# Patient Record
Sex: FEMALE | Race: WHITE
[De-identification: names, ages, dates, MRNs, and addresses within clinical notes are randomized per-mention and may not be internally consistent; named-entity substitution may affect disease eponyms.]

---

## 2021-03-10 ENCOUNTER — HOSPITAL ENCOUNTER (INPATIENT)
Dept: HOSPITAL 96 - M.ERS | Age: 66
LOS: 2 days | Discharge: HOME | DRG: 377 | End: 2021-03-12
Attending: INTERNAL MEDICINE | Admitting: INTERNAL MEDICINE
Payer: COMMERCIAL

## 2021-03-10 VITALS — DIASTOLIC BLOOD PRESSURE: 83 MMHG | SYSTOLIC BLOOD PRESSURE: 165 MMHG

## 2021-03-10 VITALS — DIASTOLIC BLOOD PRESSURE: 70 MMHG | SYSTOLIC BLOOD PRESSURE: 109 MMHG

## 2021-03-10 VITALS — BODY MASS INDEX: 40.29 KG/M2 | HEIGHT: 64.02 IN | WEIGHT: 236 LBS

## 2021-03-10 VITALS — DIASTOLIC BLOOD PRESSURE: 47 MMHG | SYSTOLIC BLOOD PRESSURE: 125 MMHG

## 2021-03-10 VITALS — DIASTOLIC BLOOD PRESSURE: 64 MMHG | SYSTOLIC BLOOD PRESSURE: 130 MMHG

## 2021-03-10 VITALS — DIASTOLIC BLOOD PRESSURE: 76 MMHG | SYSTOLIC BLOOD PRESSURE: 161 MMHG

## 2021-03-10 VITALS — SYSTOLIC BLOOD PRESSURE: 128 MMHG | DIASTOLIC BLOOD PRESSURE: 57 MMHG

## 2021-03-10 DIAGNOSIS — E11.9: ICD-10-CM

## 2021-03-10 DIAGNOSIS — J44.9: ICD-10-CM

## 2021-03-10 DIAGNOSIS — K44.9: ICD-10-CM

## 2021-03-10 DIAGNOSIS — I65.29: ICD-10-CM

## 2021-03-10 DIAGNOSIS — K64.8: ICD-10-CM

## 2021-03-10 DIAGNOSIS — F32.9: ICD-10-CM

## 2021-03-10 DIAGNOSIS — J96.10: ICD-10-CM

## 2021-03-10 DIAGNOSIS — Z90.710: ICD-10-CM

## 2021-03-10 DIAGNOSIS — E03.9: ICD-10-CM

## 2021-03-10 DIAGNOSIS — Z90.49: ICD-10-CM

## 2021-03-10 DIAGNOSIS — Z20.822: ICD-10-CM

## 2021-03-10 DIAGNOSIS — K92.2: Primary | ICD-10-CM

## 2021-03-10 DIAGNOSIS — Z86.718: ICD-10-CM

## 2021-03-10 DIAGNOSIS — K21.9: ICD-10-CM

## 2021-03-10 DIAGNOSIS — I10: ICD-10-CM

## 2021-03-10 DIAGNOSIS — F17.210: ICD-10-CM

## 2021-03-10 DIAGNOSIS — E78.00: ICD-10-CM

## 2021-03-10 DIAGNOSIS — K55.039: ICD-10-CM

## 2021-03-10 DIAGNOSIS — R65.10: ICD-10-CM

## 2021-03-10 LAB
ABSOLUTE MONOCYTES: 0.5 THOU/UL (ref 0–1.2)
ALBUMIN SERPL-MCNC: 3.1 G/DL (ref 3.4–5)
ALP SERPL-CCNC: 134 U/L (ref 46–116)
ALT SERPL-CCNC: 83 U/L (ref 30–65)
ANION GAP SERPL CALC-SCNC: 9 MMOL/L (ref 7–16)
AST SERPL-CCNC: 33 U/L (ref 15–37)
BACTERIA-REFLEX: (no result) /HPF
BILIRUB SERPL-MCNC: 0.4 MG/DL
BILIRUB UR-MCNC: NEGATIVE MG/DL
BUN SERPL-MCNC: 26 MG/DL (ref 7–18)
CALCIUM SERPL-MCNC: 9.5 MG/DL (ref 8.5–10.1)
CHLORIDE SERPL-SCNC: 101 MMOL/L (ref 98–107)
CO2 SERPL-SCNC: 28 MMOL/L (ref 21–32)
COLOR UR: YELLOW
CREAT SERPL-MCNC: 0.8 MG/DL (ref 0.6–1.3)
GLUCOSE SERPL-MCNC: 183 MG/DL (ref 70–99)
GRANULOCYTES NFR BLD MANUAL: 84 %
HCT VFR BLD CALC: 43 % (ref 37–47)
HGB BLD-MCNC: 13.6 GM/DL (ref 12–15)
INR PPP: 0.9
KETONES UR STRIP-MCNC: NEGATIVE MG/DL
LIPASE: 75 U/L (ref 73–393)
LYMPHOCYTES # BLD: 1.9 THOU/UL (ref 0.8–5.3)
LYMPHOCYTES NFR BLD AUTO: 8 %
MCH RBC QN AUTO: 27.5 PG (ref 26–34)
MCHC RBC AUTO-ENTMCNC: 31.6 G/DL (ref 28–37)
MCV RBC: 87 FL (ref 80–100)
METAMYELOCYTES NFR BLD: 1 %
MONOCYTES NFR BLD: 3 %
MPV: 7.1 FL. (ref 7.2–11.1)
NEUTROPHILS # BLD: 13.3 THOU/UL (ref 1.6–8.1)
NUCLEATED RBCS: 0 /100WBC
PLATELET # BLD EST: ADEQUATE 10*3/UL
PLATELET COUNT*: 400 THOU/UL (ref 150–400)
POTASSIUM SERPL-SCNC: 4 MMOL/L (ref 3.5–5.1)
PROT SERPL-MCNC: 7.4 G/DL (ref 6.4–8.2)
PROT UR QL STRIP: NEGATIVE
PROTHROMBIN TIME: 9.8 SECONDS (ref 9.2–11.5)
RBC # BLD AUTO: 4.94 MIL/UL (ref 4.2–5)
RBC # UR STRIP: (no result) /UL
RBC MORPH BLD: NORMAL
RDW-CV: 15.5 % (ref 10.5–14.5)
RENAL EPITHELIAL CELLS: (no result) /LPF
SODIUM SERPL-SCNC: 138 MMOL/L (ref 136–145)
SP GR UR STRIP: >= 1.03 (ref 1–1.03)
SQUAMOUS: (no result) /LPF (ref 0–3)
URINE CLARITY: CLEAR
URINE GLUCOSE-RANDOM: NEGATIVE
URINE LEUKOCYTES-REFLEX: NEGATIVE
URINE NITRITE-REFLEX: NEGATIVE
URINE WBC-REFLEX: (no result) /HPF (ref 0–5)
UROBILINOGEN UR STRIP-ACNC: 0.2 E.U./DL (ref 0.2–1)
VARIANT LYMPHS NFR BLD MANUAL: 4 %
WBC # BLD AUTO: 15.7 THOU/UL (ref 4–11)

## 2021-03-10 NOTE — EKG
[place] [address]
Phone:  [phone]                     ELECTROCARDIOGRAM REPORT      
_______________________________________________________________________________
 
Name:         ALFREDO ZARAGOZA            Room:          [address]    ADM [place] 
M.R.#:    [id number]     Account #:     [id number]  
Admission:    03/10/21    Attend Phys:   Liang Perez, 
Discharge:                Date of Birth: [birth year]  
Date of Service: 03/10/21 0638  Report #:      [id number]
        [id number]
_______________________________________________________________________________
THIS REPORT FOR:  //name//                      
 
                         [facility] ED
                                       
Test Date:    2021-03-10               Test Time:    06:38:19
Pat Name:     ALFREDOCORTNEY ZARAGOZA         Department:   
Patient ID:   [id number]            Room:         [facility]
Gender:       F                        Technician:   [facility]
:          1955               Requested By: Selena Owusu
Order Number: [id number] MD:   Lawrence Andres
                                 Measurements
Intervals                              Axis          
Rate:         75                       P:            -37
LA:           181                      QRS:          46
QRSD:         94                       T:            53
QT:           397                                    
QTc:          444                                    
                           Interpretive Statements
Sinus rhythm
No previous ECG available for comparison
Electronically Signed On 3- 13:00:26 CST by Lawrence Andres
[email or web address]
 
 
 
 
 
 
 
 
 
 
 
 
 
 
 
 
 
 
 
 
 
 
  <ELECTRONICALLY SIGNED>
                                           By: Lawrence Andres MD, FAC     
  03/10/21     1300
D: 03/10/21 0638   _____________________________________
T: 03/10/21 0638   Lawrence Andres MD, [facility]       /EPI

## 2021-03-11 VITALS — SYSTOLIC BLOOD PRESSURE: 146 MMHG | DIASTOLIC BLOOD PRESSURE: 37 MMHG

## 2021-03-11 VITALS — SYSTOLIC BLOOD PRESSURE: 141 MMHG | DIASTOLIC BLOOD PRESSURE: 65 MMHG

## 2021-03-11 VITALS — DIASTOLIC BLOOD PRESSURE: 63 MMHG | SYSTOLIC BLOOD PRESSURE: 140 MMHG

## 2021-03-11 VITALS — DIASTOLIC BLOOD PRESSURE: 77 MMHG | SYSTOLIC BLOOD PRESSURE: 154 MMHG

## 2021-03-11 VITALS — DIASTOLIC BLOOD PRESSURE: 51 MMHG | SYSTOLIC BLOOD PRESSURE: 152 MMHG

## 2021-03-11 VITALS — SYSTOLIC BLOOD PRESSURE: 142 MMHG | DIASTOLIC BLOOD PRESSURE: 63 MMHG

## 2021-03-11 LAB
ABSOLUTE BASOPHILS: 0.1 THOU/UL (ref 0–0.2)
ABSOLUTE EOSINOPHILS: 0.1 THOU/UL (ref 0–0.7)
ABSOLUTE MONOCYTES: 0.9 THOU/UL (ref 0–1.2)
ANION GAP SERPL CALC-SCNC: 7 MMOL/L (ref 7–16)
BASOPHILS NFR BLD AUTO: 1 %
BUN SERPL-MCNC: 18 MG/DL (ref 7–18)
CALCIUM SERPL-MCNC: 8.5 MG/DL (ref 8.5–10.1)
CHLORIDE SERPL-SCNC: 106 MMOL/L (ref 98–107)
CO2 SERPL-SCNC: 29 MMOL/L (ref 21–32)
CREAT SERPL-MCNC: 0.8 MG/DL (ref 0.6–1.3)
EOSINOPHIL NFR BLD: 0.8 %
GLUCOSE SERPL-MCNC: 123 MG/DL (ref 70–99)
GRANULOCYTES NFR BLD MANUAL: 82.5 %
HCT VFR BLD CALC: 41.2 % (ref 37–47)
HGB BLD-MCNC: 12.9 GM/DL (ref 12–15)
LYMPHOCYTES # BLD: 1.1 THOU/UL (ref 0.8–5.3)
LYMPHOCYTES NFR BLD AUTO: 8.9 %
MCH RBC QN AUTO: 27.6 PG (ref 26–34)
MCHC RBC AUTO-ENTMCNC: 31.3 G/DL (ref 28–37)
MCV RBC: 88.2 FL (ref 80–100)
MONOCYTES NFR BLD: 6.8 %
MPV: 7.3 FL. (ref 7.2–11.1)
NEUTROPHILS # BLD: 10.5 THOU/UL (ref 1.6–8.1)
NUCLEATED RBCS: 0 /100WBC
PLATELET COUNT*: 358 THOU/UL (ref 150–400)
POTASSIUM SERPL-SCNC: 4.1 MMOL/L (ref 3.5–5.1)
RBC # BLD AUTO: 4.67 MIL/UL (ref 4.2–5)
RDW-CV: 15.5 % (ref 10.5–14.5)
SODIUM SERPL-SCNC: 142 MMOL/L (ref 136–145)
WBC # BLD AUTO: 12.8 THOU/UL (ref 4–11)

## 2021-03-12 VITALS — SYSTOLIC BLOOD PRESSURE: 150 MMHG | DIASTOLIC BLOOD PRESSURE: 56 MMHG

## 2021-03-12 VITALS — SYSTOLIC BLOOD PRESSURE: 165 MMHG | DIASTOLIC BLOOD PRESSURE: 51 MMHG

## 2021-03-12 VITALS — DIASTOLIC BLOOD PRESSURE: 47 MMHG | SYSTOLIC BLOOD PRESSURE: 143 MMHG

## 2021-03-12 VITALS — DIASTOLIC BLOOD PRESSURE: 56 MMHG | SYSTOLIC BLOOD PRESSURE: 150 MMHG

## 2021-03-12 VITALS — SYSTOLIC BLOOD PRESSURE: 95 MMHG | DIASTOLIC BLOOD PRESSURE: 79 MMHG

## 2021-03-12 LAB
ABSOLUTE BASOPHILS: 0.1 THOU/UL (ref 0–0.2)
ABSOLUTE EOSINOPHILS: 0.2 THOU/UL (ref 0–0.7)
ABSOLUTE MONOCYTES: 0.8 THOU/UL (ref 0–1.2)
ANION GAP SERPL CALC-SCNC: 8 MMOL/L (ref 7–16)
BASOPHILS NFR BLD AUTO: 0.8 %
BUN SERPL-MCNC: 9 MG/DL (ref 7–18)
CALCIUM SERPL-MCNC: 8.7 MG/DL (ref 8.5–10.1)
CHLORIDE SERPL-SCNC: 110 MMOL/L (ref 98–107)
CO2 SERPL-SCNC: 26 MMOL/L (ref 21–32)
CREAT SERPL-MCNC: 0.7 MG/DL (ref 0.6–1.3)
EOSINOPHIL NFR BLD: 1.7 %
GLUCOSE SERPL-MCNC: 103 MG/DL (ref 70–99)
GRANULOCYTES NFR BLD MANUAL: 77.8 %
HCT VFR BLD CALC: 37.5 % (ref 37–47)
HGB BLD-MCNC: 11.8 GM/DL (ref 12–15)
LYMPHOCYTES # BLD: 1.1 THOU/UL (ref 0.8–5.3)
LYMPHOCYTES NFR BLD AUTO: 11.5 %
MCH RBC QN AUTO: 27.8 PG (ref 26–34)
MCHC RBC AUTO-ENTMCNC: 31.5 G/DL (ref 28–37)
MCV RBC: 88.1 FL (ref 80–100)
MONOCYTES NFR BLD: 8.2 %
MPV: 7.3 FL. (ref 7.2–11.1)
NEUTROPHILS # BLD: 7.7 THOU/UL (ref 1.6–8.1)
NUCLEATED RBCS: 0 /100WBC
PLATELET COUNT*: 320 THOU/UL (ref 150–400)
POTASSIUM SERPL-SCNC: 3.6 MMOL/L (ref 3.5–5.1)
RBC # BLD AUTO: 4.25 MIL/UL (ref 4.2–5)
RDW-CV: 15.5 % (ref 10.5–14.5)
SODIUM SERPL-SCNC: 144 MMOL/L (ref 136–145)
WBC # BLD AUTO: 9.9 THOU/UL (ref 4–11)

## 2021-03-12 PROCEDURE — 0DB98ZX EXCISION OF DUODENUM, VIA NATURAL OR ARTIFICIAL OPENING ENDOSCOPIC, DIAGNOSTIC: ICD-10-PCS | Performed by: INTERNAL MEDICINE

## 2021-03-12 PROCEDURE — 0DBN8ZX EXCISION OF SIGMOID COLON, VIA NATURAL OR ARTIFICIAL OPENING ENDOSCOPIC, DIAGNOSTIC: ICD-10-PCS | Performed by: INTERNAL MEDICINE

## 2021-03-16 NOTE — PATH
[address]  [address]                    PATHOLOGY RPT PROCEDURE       
_______________________________________________________________________________
 
Name:       ODALIS WILLINGHAM             Room:           [address] [place]  
M.R.#:  [id number]      Account #:      [id number]  
Admission:  03/10/21     Date of Birth:  [birth year]  
Discharge:  03/12/21                   Report #:    [id number]
                                                         Path Case #: [id number]
_______________________________________________________________________________
 
[facility] Accession Number: [id number]
.                                                                01
Material submitted:                                        .
PART A: duodenum - DUODENUM
PART B: sigmoid colon - PROXIMAL SIGMOID COLON. Modifiers: proximal
.                                                                01
Clinical history:                                          .
EGD AND COLONOSCOPY
.                                                                02
**********************************************************************
Diagnosis:
A. Small bowel "duodenum", endoscopic biopsy:
   - Duodenal mucosa with gastric foveolar metaplasia, Brunner gland
      hyperplasia, and mild villous blunting, consistent with peptic
      duodenitis.
   - Negative for dysplasia and malignancy.
.
B. Large bowel "proximal sigmoid colon", endoscopic biopsy:
   - Large bowel mucosa with features of ischemic colitis; negative for
      dysplasia and malignancy.
.
(MLK:ishmael; 03/15/2021)
MBR  03/16/2021  1200 Local
**********************************************************************
.                                                                02
Electronically signed:                                     .
John Pantoja MD, Pathologist
NPI- [id number]
.                                                                01
Gross description:                                         .
A.  The specimen is received in formalin, labeled "Odalis Willingham,
duodenum biopsy".  Received are two segments of pale tan tissue measuring
0.4 cm each in maximum dimensions.  The specimen is submitted entirely in
cassette A1.
.
B.  The specimen is received in formalin, labeled "Odalis Willingham,
proximal sigmoid colon".  Received are five segments of pale tan tissue
ranging in size from 0.2-0.6 cm in maximum dimensions.  The specimen is
submitted entirely in cassette B1.
([facility]; 3/14/2021)
QAC/QA  03/14/2021  1719 Local
.                                                                02
Pathologist provided ICD-10:
K31.9, K92.2
.                                                                02
CPT                                                        .
216404, 773695
Specimen Comment: A courtesy copy of this report has been sent to [phone]
 
[place] [address]                    PATHOLOGY RPT PROCEDURE       
_______________________________________________________________________________
 
Name:       ODALIS WILLINGHAM             Room:           [address] [place]  
M.R.#:  [id number]      Account #:      [id number]  
Admission:  03/10/21     Date of Birth:  [birth year]  
Discharge:  03/12/21                   Report #:    [id number]
                                                         Path Case #: [id number]
_______________________________________________________________________________
Specimen Comment: Report sent to 
***Performed at:  01
   [facility] [address], [place], [place]  [id number]
   MD Yasmany Enamorado MD Phone:  [phone]
***Performed at:  02
   [facility] [place] [address] [address], [place], [place]  [id number]
   MD Feliz Meek MD Phone:  [phone]

## 2021-12-22 ENCOUNTER — HOSPITAL ENCOUNTER (OUTPATIENT)
Dept: HOSPITAL 96 - M.CRD | Age: 66
End: 2021-12-22
Attending: FAMILY MEDICINE
Payer: COMMERCIAL

## 2021-12-22 DIAGNOSIS — I51.81: Primary | ICD-10-CM

## 2021-12-22 DIAGNOSIS — I10: ICD-10-CM

## 2021-12-22 NOTE — 2DMMODE
[place] [address]
Phone:  [phone] 2 D/M-MODE ECHOCARDIOGRAM     
_______________________________________________________________________________
 
Name:         ALFREDO ZARAGOZA            Room:                     REG CLI
M.R.#:    [id number]     Account #:     [id number]  
Admission:    21    Attend Phys:   Vel Mak, 
Discharge:                Date of Birth: [birth year]  
Date of Service: 21 1038  Report #:      [id number]
        [id number]
_______________________________________________________________________________
THIS REPORT FOR:
 
cc:  Vel Mak,Artis Argueta MD [facility]        
                                                                       ~
 
--------------- APPROVED REPORT --------------
 
 
Study performed:  2021 09:06:13
 
EXAM: Comprehensive 2D, Doppler, and color-flow 
Echocardiogram 
Patient Location: Out-Patient   
 
      BSA:         2.10
HR: 72 bpm BP:          118/78 mmHg 
 
Other Information 
Study Quality: Fair
 
Indications
Hypertension/HDD
Takotsubo CM
 
2D Dimensions
IVSd:  10.76 (7-11mm) LVOT Diam:  20.90 (18-24mm) 
LVDd:  41.64 mm  
PWd:  11.78 (7-11mm) Ascending Ao:  29.07 (22-36mm)
LVDs:  27.82 (25-40mm) 
Aortic Root:  28.28 mm 
 
Volumes
Left Atrial Volume (Systole) 
    LA ESV Index:  10.10 mL/m2
 
Aortic Valve
AoV Peak Jorge.:  1.27 m/s 
AO Peak Gr.:  6.46 mmHg  LVOT Max P.30 mmHg
AO Mean Gr.:  3.35 mmHg  LVOT Mean P.35 mmHg
    LVOT Max V:  1.15 m/s
AO V2 VTI:  23.91 cm  LVOT Mean V:  0.69 m/s
LEON (VTI):  3.30 cm2  LVOT V1 VTI:  22.97 cm
 
Mitral Valve
 
 
[place] [address]
Phone:  [phone]                     2 D/M-MODE ECHOCARDIOGRAM     
_______________________________________________________________________________
 
Name:         ALFREDO ZARAGOZA            Room:                     REG CLI
M.R.#:    [id number]     Account #:     [id number]  
Admission:    21    Attend Phys:   Vel Mak, 
Discharge:                Date of Birth: [birth year]  
Date of Service: 21 [employer]  Report #:      [id number]
        [id number]
_______________________________________________________________________________
    E/A Ratio:  0.64
    MV Decel. Time:  244.49 ms
MV E Max Jorge.:  0.41 m/s 
MV PHT:  70.90 ms  
MVA (PHT):  3.10 cm2  
 
TDI
E/Lateral E':  5.86 E/Medial E':  5.13
   Medial E' Jorge.:  0.08 m/s
   Lateral E' Jorge.:  0.07 m/s
 
Pulmonary Valve
PV Peak Jorge.:  0.84 m/s PV Peak Gr.:  2.82 mmHg
 
Left Ventricle
The left ventricle is normal size. There is normal LV segmental wall 
motion. There is normal left ventricular wall thickness. Left 
ventricular systolic function is normal. The left ventricular 
ejection fraction is within the normal range. LVEF is 55-60%. Grade I 
- abnormal relaxation pattern.
 
Right Ventricle
The right ventricle is normal size. The right ventricular systolic 
function is normal.
 
Atria
The left atrium size is normal. The right atrium size is 
normal.
 
Aortic Valve
The aortic valve is normal in structure. No aortic regurgitation is 
present. There is no aortic valvular stenosis.
 
Mitral Valve
The mitral valve is normal in structure. There is trace mitral valve 
regurgitation noted. No evidence of mitral valve stenosis.
 
Tricuspid Valve
The tricuspid valve is normal in structure. There is no tricuspid 
valve regurgitation noted.
 
Pulmonic Valve
The pulmonary valve is normal in structure. There is no pulmonic 
valvular regurgitation.
 
[facility]
 
 
[place] [address]
Phone:  [phone]                     2 D/M-MODE ECHOCARDIOGRAM     
_______________________________________________________________________________
 
Name:         ALFREDO ZARAGOZA            Room:                     REG CLI
M.R.#:    [id number]     Account #:     [id number]  
Admission:    21    Attend Phys:   Vel Mak, 
Discharge:                Date of Birth: [birth year]  
Date of Service: 21 [employer]  Report #:      [id number]
        [id number]
_______________________________________________________________________________
The aortic root is normal in size. IVC is normal in size and 
collapses >50% with inspiration.
 
Pericardium
There is no pericardial effusion.
 
<Conclusion>
Left ventricular systolic function is normal.
The left ventricular ejection fraction is within the normal range.
 
 
 
 
 
 
 
 
 
 
 
 
 
 
 
 
 
 
 
 
 
 
 
 
 
 
 
 
 
 
 
 
 
 
 
  <ELECTRONICALLY SIGNED>
                                           By: Artis Stapleton MD, FACC      
  21     1038
D: 21 1038   _____________________________________
T: 21 1038   Artis Stapleton MD, FACC        /INF